# Patient Record
Sex: MALE | Race: BLACK OR AFRICAN AMERICAN | NOT HISPANIC OR LATINO
[De-identification: names, ages, dates, MRNs, and addresses within clinical notes are randomized per-mention and may not be internally consistent; named-entity substitution may affect disease eponyms.]

---

## 2019-03-12 PROBLEM — Z00.00 ENCOUNTER FOR PREVENTIVE HEALTH EXAMINATION: Status: ACTIVE | Noted: 2019-03-12

## 2019-03-21 ENCOUNTER — APPOINTMENT (OUTPATIENT)
Dept: HEART AND VASCULAR | Facility: CLINIC | Age: 34
End: 2019-03-21

## 2019-04-12 ENCOUNTER — APPOINTMENT (OUTPATIENT)
Dept: VASCULAR SURGERY | Facility: CLINIC | Age: 34
End: 2019-04-12
Payer: COMMERCIAL

## 2019-04-12 VITALS — DIASTOLIC BLOOD PRESSURE: 81 MMHG | OXYGEN SATURATION: 98 % | HEART RATE: 77 BPM | SYSTOLIC BLOOD PRESSURE: 132 MMHG

## 2019-04-12 PROCEDURE — 99245 OFF/OP CONSLTJ NEW/EST HI 55: CPT

## 2019-04-12 NOTE — PHYSICAL EXAM
[JVD] : no jugular venous distention  [2+] : left 2+ [Ankle Swelling (On Exam)] : not present [Varicose Veins Of Lower Extremities] : not present [] : not present [Abdomen Masses] : No abdominal masses [Abdomen Tenderness] : ~T ~M No abdominal tenderness [Abdominal Bruit] : no abdominal bruit  [No HSM] : no hepatosplenomegaly [Calm] : calm [de-identified] : pleasant [de-identified] : no rebound, no guarding. [de-identified] : pain is at the lower aspect of the right ribs at the flank\par

## 2019-04-12 NOTE — ASSESSMENT
[FreeTextEntry1] : reviewed CT scan and spoke to radiology\par pain is ellicited on palpation of the lower ribs and muscles of the flank\par

## 2019-04-12 NOTE — HISTORY OF PRESENT ILLNESS
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

## 2019-11-06 ENCOUNTER — APPOINTMENT (OUTPATIENT)
Dept: NEUROLOGY | Facility: CLINIC | Age: 34
End: 2019-11-06
Payer: COMMERCIAL

## 2019-11-06 VITALS
BODY MASS INDEX: 25.71 KG/M2 | DIASTOLIC BLOOD PRESSURE: 75 MMHG | HEIGHT: 73 IN | SYSTOLIC BLOOD PRESSURE: 128 MMHG | HEART RATE: 74 BPM | OXYGEN SATURATION: 98 % | WEIGHT: 194 LBS

## 2019-11-06 DIAGNOSIS — G72.9 MYOPATHY, UNSPECIFIED: ICD-10-CM

## 2019-11-06 PROCEDURE — 99203 OFFICE O/P NEW LOW 30 MIN: CPT

## 2019-11-19 NOTE — ASSESSMENT
[FreeTextEntry1] : Is a 2 fe[age] gentleman who is being evaluated for the possibility of myopathy he has no evidence to indicate any focal neurological deficits although a neural myopathic process needs to be excluded. He will proceed with studies to include EMGs and blood studies I will subsequent to see him for followup and to determine whether there is any objective neurological deficits.

## 2019-11-19 NOTE — PHYSICAL EXAM
[FreeTextEntry1] : Examination he is awake alert answering appropriately without limitation involving his neck or back. The color temperature with skin changes noted no bruits detectable graphesthesia astereognosis localization extinction to be normal no evidence of cranial or carotid bruits detectable is no specific muscle tenderness no additional neurological deficits can be found on his examination.

## 2019-11-19 NOTE — HISTORY OF PRESENT ILLNESS
[FreeTextEntry1] : This is a 34-year-old gentleman who is being evaluated for the possibility of a myopathy with complaints of weakness. I seen copies of previous medical records from vascular surgeon as well as additional records contained within the patient's chart is being evaluated for any weakness numbness tingling or any neurological symptoms to account for any musculoskeletal complaints. Is no bowel or bladder difficulties denies any cranial nerve issues without any diplopia swallowing difficulties or chest pain.

## 2019-12-30 ENCOUNTER — APPOINTMENT (OUTPATIENT)
Dept: NEUROLOGY | Facility: CLINIC | Age: 34
End: 2019-12-30